# Patient Record
Sex: MALE | Race: BLACK OR AFRICAN AMERICAN | NOT HISPANIC OR LATINO | Employment: FULL TIME | URBAN - METROPOLITAN AREA
[De-identification: names, ages, dates, MRNs, and addresses within clinical notes are randomized per-mention and may not be internally consistent; named-entity substitution may affect disease eponyms.]

---

## 2017-04-26 ENCOUNTER — OFFICE VISIT (OUTPATIENT)
Dept: INTERNAL MEDICINE | Facility: CLINIC | Age: 52
End: 2017-04-26
Payer: COMMERCIAL

## 2017-04-26 ENCOUNTER — HOSPITAL ENCOUNTER (OUTPATIENT)
Dept: RADIOLOGY | Facility: HOSPITAL | Age: 52
Discharge: HOME | End: 2017-04-26
Attending: NURSE PRACTITIONER
Payer: COMMERCIAL

## 2017-04-26 VITALS
WEIGHT: 222.69 LBS | DIASTOLIC BLOOD PRESSURE: 92 MMHG | BODY MASS INDEX: 30.16 KG/M2 | TEMPERATURE: 97 F | SYSTOLIC BLOOD PRESSURE: 144 MMHG | HEIGHT: 72 IN | HEART RATE: 58 BPM

## 2017-04-26 DIAGNOSIS — R10.9 RIGHT FLANK PAIN: ICD-10-CM

## 2017-04-26 DIAGNOSIS — R10.9 RIGHT FLANK PAIN: Primary | ICD-10-CM

## 2017-04-26 PROCEDURE — 96372 THER/PROPH/DIAG INJ SC/IM: CPT | Mod: S$GLB,,, | Performed by: NURSE PRACTITIONER

## 2017-04-26 PROCEDURE — 99203 OFFICE O/P NEW LOW 30 MIN: CPT | Mod: 25,S$GLB,, | Performed by: NURSE PRACTITIONER

## 2017-04-26 PROCEDURE — 1160F RVW MEDS BY RX/DR IN RCRD: CPT | Mod: S$GLB,,, | Performed by: NURSE PRACTITIONER

## 2017-04-26 PROCEDURE — 74020 XR ABDOMEN FLAT AND ERECT: CPT | Mod: 26,,, | Performed by: RADIOLOGY

## 2017-04-26 PROCEDURE — 74020 XR ABDOMEN FLAT AND ERECT: CPT | Mod: TC,PO

## 2017-04-26 PROCEDURE — 99999 PR PBB SHADOW E&M-NEW PATIENT-LVL III: CPT | Mod: PBBFAC,,, | Performed by: NURSE PRACTITIONER

## 2017-04-26 RX ORDER — KETOROLAC TROMETHAMINE 30 MG/ML
60 INJECTION, SOLUTION INTRAMUSCULAR; INTRAVENOUS
Status: COMPLETED | OUTPATIENT
Start: 2017-04-26 | End: 2017-04-26

## 2017-04-26 RX ORDER — NAPROXEN 500 MG/1
500 TABLET ORAL 2 TIMES DAILY WITH MEALS
Qty: 14 TABLET | Refills: 0 | Status: SHIPPED | OUTPATIENT
Start: 2017-04-26 | End: 2017-05-03

## 2017-04-26 RX ADMIN — KETOROLAC TROMETHAMINE 60 MG: 30 INJECTION, SOLUTION INTRAMUSCULAR; INTRAVENOUS at 04:04

## 2017-04-26 NOTE — MR AVS SNAPSHOT
[facility]  [address] [place] [address]  Phone: [phone]  Fax: [phone]                  Kenneth Poon   2017 4:20 PM   Office Visit    Description:  Male : 1965   Provider:  Enid Quinonez NP   Department:  [facility]           Reason for Visit     Back Pain           Diagnoses this Visit        Comments    Right flank pain    -  Primary start naproxen tomorrow. dependent upon lab results and if pain persists.            To Do List           Goals (5 Years of Data)     None       These Medications        Disp Refills Start End    naproxen (NAPROSYN) 500 MG tablet 14 tablet 0 2017 5/3/2017    Take 1 tablet (500 mg total) by mouth 2 (two) times daily with meals. - Oral    Pharmacy: [facility]  [address]   [place]  #: [phone]         [facility]     [facility] Nurse Care Line -  Assistance  Unless otherwise directed by your provider, please contact [facility], our nurse care line that is available for  assistance.     Registered nurses in the [facility] provide: appointment scheduling, clinical advisement, health education, and other advisory services.  Call: [phone] (toll free)               Medications           Message regarding Medications     Verify the changes and/or additions to your medication regime listed below are the same as discussed with your clinician today.  If any of these changes or additions are incorrect, please notify your healthcare provider.        START taking these NEW medications        Refills    naproxen (NAPROSYN) 500 MG tablet 0    Sig: Take 1 tablet (500 mg total) by mouth 2 (two) times daily with meals.    Class: Normal    Route: Oral      These medications were administered today        Dose Freq    ketorolac injection 60 mg 60 mg Clinic/HOD 1 time    Sig: Inject 2 mLs (60 mg total) into the muscle one time.    Class:  Normal    Route: Intramuscular           Verify that the below list of medications is an accurate representation of the medications you are currently taking.  If none reported, the list may be blank. If incorrect, please contact your healthcare provider. Carry this list with you in case of emergency.           Current Medications     naproxen (NAPROSYN) 500 MG tablet Take 1 tablet (500 mg total) by mouth 2 (two) times daily with meals.           Clinical Reference Information           Your Vitals Were     BP Pulse Temp Height Weight BMI    144/92 58 96.7 °F (35.9 °C) (Tympanic) 6' (1.829 m) 101 kg (222 lb 10.6 oz) 30.2 kg/m2      Blood Pressure          Most Recent Value    BP  (!)  144/92      Allergies as of 4/26/2017     No Known Allergies      Immunizations Administered on Date of Encounter - 4/26/2017     None      Orders Placed During Today's Visit     Future Labs/Procedures Expected by Expires    CBC auto differential  4/26/2017 7/25/2017    Comprehensive metabolic panel  4/26/2017 7/25/2017    Urinalysis  4/26/2017 7/25/2017    X-Ray Abdomen Flat And Erect  4/26/2017 4/26/2018      [facility] Sign-Up     Activating your [facility] account is as easy as 1-2-3!     1) Visit [email or web address], select Sign Up Now, enter this activation code and your date of birth, then select Next.  [email or web address]  Expires: 6/10/2017  4:40 PM      2) Create a username and password to use when you visit [facility] in the future and select a security question in case you lose your password and select Next.    3) Enter your e-mail address and click Sign Up!    Additional Information  If you have questions, please e-mail [email or web address] or call [phone] to talk to our [facility] staff. Remember, [facility] is NOT to be used for urgent needs. For medical emergencies, dial 911.         Instructions    INCREASE WATER INTAKE         Language Assistance Services     ATTENTION: Language assistance services are available, free of  charge. Please call [phone].      ATENCIÓN: Si habla español, tiene a starks disposición servicios gratuitos de asistencia lingüística. Llame al [phone].     CHÚ Ý: N?u b?n nói Ti?ng Vi?t, có các d?ch v? h? tr? ngôn ng? mi?n phí dành cho b?n. G?i s? [phone].         [facility] complies with applicable Federal civil rights laws and does not discriminate on the basis of race, color, national origin, age, disability, or sex.

## 2017-04-26 NOTE — PROGRESS NOTES
Subjective:       Patient ID: Kenneth Poon is a 52 y.o. male.    Chief Complaint: Back Pain (right flank )    HPI Comments: Lower Back Pain- (right) started last night. Thought it was gas drank coke- belched a lot. Radiates from lower back to right lower abd and right groin. He denies pulling, pushing, or lifting anything heavy. He rates pain 7/10. pain is constant. He has not taken anything for pain. No hx of back surgeries, chronic back pain, kidney stones, gallstones, appendicitis, constipation,. No blood in stool. Regular BM every day. No fever, sweats, chills, cp, sob, n/v/d, headaches, blurred vision, dizziness, or any other acute problems. Pt admits to not drinking much water.      Has not seen a health care provider in 5 years- from [place]- needs PCP    No chronic problems besides BPH- no medications prescribed.     Back Pain   Pertinent negatives include no abdominal pain, chest pain, dysuria, fever or headaches.     Review of Systems   Constitutional: Negative for chills, fatigue and fever.   HENT: Negative for congestion, ear pain, postnasal drip, rhinorrhea, sinus pressure, sneezing and sore throat.    Eyes: Negative for photophobia, pain and visual disturbance.   Respiratory: Negative for cough, chest tightness and shortness of breath.    Cardiovascular: Negative for chest pain, palpitations and leg swelling.   Gastrointestinal: Negative for abdominal pain, constipation, diarrhea, nausea and vomiting.   Genitourinary: Positive for flank pain. Negative for dysuria and frequency.   Musculoskeletal: Negative for arthralgias.   Neurological: Negative for dizziness, light-headedness and headaches.   Psychiatric/Behavioral: Negative for sleep disturbance.       Objective:      Physical Exam   Constitutional: He is oriented to person, place, and time. He appears well-developed and well-nourished.   HENT:   Head: Normocephalic and atraumatic.   Right Ear: Tympanic membrane normal.   Left Ear: Tympanic  membrane normal.   Eyes: Conjunctivae and EOM are normal. Pupils are equal, round, and reactive to light.   Neck: Normal range of motion. Neck supple.   Cardiovascular: Normal rate, regular rhythm, normal heart sounds and intact distal pulses.    Pulmonary/Chest: Effort normal and breath sounds normal.   Abdominal: Soft. Bowel sounds are normal. There is CVA tenderness (right ).   Musculoskeletal: Normal range of motion.   Neurological: He is alert and oriented to person, place, and time.   Skin: Skin is warm and dry.   Psychiatric: He has a normal mood and affect.       Assessment:       1. Right flank pain        Plan:   Right flank pain  Comments:  start naproxen tomorrow. dependent upon lab results and if pain persists.   Orders:  -     X-Ray Abdomen Flat And Erect; Future; Expected date: 4/26/17  -     Comprehensive metabolic panel; Future; Expected date: 4/26/17  -     CBC auto differential; Future; Expected date: 4/26/17  -     Urinalysis; Future; Expected date: 4/26/17  -     ketorolac injection 60 mg; Inject 2 mLs (60 mg total) into the muscle one time.  -     naproxen (NAPROSYN) 500 MG tablet; Take 1 tablet (500 mg total) by mouth 2 (two) times daily with meals.  Dispense: 14 tablet; Refill: 0        As above  Increase water intake  Monitor BP  Establish with PCP

## 2017-05-02 ENCOUNTER — LAB VISIT (OUTPATIENT)
Dept: LAB | Facility: HOSPITAL | Age: 52
End: 2017-05-02
Attending: FAMILY MEDICINE
Payer: COMMERCIAL

## 2017-05-02 ENCOUNTER — OFFICE VISIT (OUTPATIENT)
Dept: INTERNAL MEDICINE | Facility: CLINIC | Age: 52
End: 2017-05-02
Payer: COMMERCIAL

## 2017-05-02 VITALS
DIASTOLIC BLOOD PRESSURE: 80 MMHG | BODY MASS INDEX: 30.67 KG/M2 | HEART RATE: 48 BPM | TEMPERATURE: 97 F | HEIGHT: 72 IN | SYSTOLIC BLOOD PRESSURE: 112 MMHG | WEIGHT: 226.44 LBS

## 2017-05-02 DIAGNOSIS — Z00.00 ANNUAL PHYSICAL EXAM: ICD-10-CM

## 2017-05-02 DIAGNOSIS — Z00.00 ANNUAL PHYSICAL EXAM: Primary | ICD-10-CM

## 2017-05-02 DIAGNOSIS — Z12.11 ENCOUNTER FOR SCREENING COLONOSCOPY: ICD-10-CM

## 2017-05-02 LAB
CHOLEST/HDLC SERPL: 3.7 {RATIO}
COMPLEXED PSA SERPL-MCNC: 0.29 NG/ML
HDL/CHOLESTEROL RATIO: 27.2 %
HDLC SERPL-MCNC: 180 MG/DL
HDLC SERPL-MCNC: 49 MG/DL
LDLC SERPL CALC-MCNC: 120 MG/DL
NONHDLC SERPL-MCNC: 131 MG/DL
TRIGL SERPL-MCNC: 55 MG/DL

## 2017-05-02 PROCEDURE — 80061 LIPID PANEL: CPT

## 2017-05-02 PROCEDURE — 36415 COLL VENOUS BLD VENIPUNCTURE: CPT | Mod: PO

## 2017-05-02 PROCEDURE — 99999 PR PBB SHADOW E&M-EST. PATIENT-LVL III: CPT | Mod: PBBFAC,,, | Performed by: FAMILY MEDICINE

## 2017-05-02 PROCEDURE — 84153 ASSAY OF PSA TOTAL: CPT

## 2017-05-02 PROCEDURE — 99386 PREV VISIT NEW AGE 40-64: CPT | Mod: S$GLB,,, | Performed by: FAMILY MEDICINE

## 2017-05-02 NOTE — LETTER
May 2, 2017      Enid Quinonez, EVELIO  [address] [place] [address]           [facility]  [address] [place]  [place] [address]  Phone: [phone]  Fax: [phone]          Patient: Kenneth Poon   MR Number: [id number]   YOB: 1965   Date of Visit: 5/2/2017       Dear Enid Quinonez:    Thank you for referring Kenneth Poon to me for evaluation. Attached you will find relevant portions of my assessment and plan of care.    If you have questions, please do not hesitate to call me. I look forward to following Kenneth Poon along with you.    Sincerely,    Alfa Mcmullen MD    Enclosure  CC:  No Recipients    If you would like to receive this communication electronically, please contact [email or web address] or [phone] to request more information on ???? Link access.    For providers and/or their staff who would like to refer a patient to [facility], please contact us through our one-stop-shop provider referral line, [facility] , at [phone].    If you feel you have received this communication in error or would no longer like to receive these types of communications, please e-mail [email or web address]

## 2017-05-02 NOTE — PROGRESS NOTES
Subjective:       Patient ID: Kenneth Poon is a 52 y.o. male.    Chief Complaint: Establish Care    HPI Comments: Establish Care:        Pt is a 52 year old who is in generally good health. Reviewed with pt colonoscopy screen PSA. Pt HR is low today but that has been a consistent finding on exams.         Review of Systems   Constitutional: Negative.    HENT: Negative.    Respiratory: Negative.    Cardiovascular: Negative.    Gastrointestinal: Negative.    Skin: Negative.    Neurological: Negative.    Psychiatric/Behavioral: Negative.        Objective:      Physical Exam   Constitutional: He is oriented to person, place, and time. He appears well-developed and well-nourished.   Cardiovascular: Normal rate and regular rhythm.    Pulmonary/Chest: Effort normal and breath sounds normal.   Abdominal: Soft. Bowel sounds are normal.   Musculoskeletal: Normal range of motion.   Neurological: He is alert and oriented to person, place, and time.   Skin: Skin is warm and dry.       Assessment:       1. Annual physical exam    2. Encounter for screening colonoscopy        Plan:       Annual physical exam  Comments:  Will do PSA and Lipid  Orders:  -     PSA, Screening; Future; Expected date: 5/2/17  -     Lipid panel; Future; Expected date: 5/2/17    Encounter for screening colonoscopy  Comments:  Will schedule for colonoscopy  Orders:  -     Case request GI: COLONOSCOPY

## 2017-08-07 PROBLEM — Z00.00 ANNUAL PHYSICAL EXAM: Status: RESOLVED | Noted: 2017-05-02 | Resolved: 2017-08-07

## 2018-02-20 ENCOUNTER — DOCUMENTATION ONLY (OUTPATIENT)
Dept: ENDOSCOPY | Facility: HOSPITAL | Age: 53
End: 2018-02-20

## 2020-04-09 ENCOUNTER — TELEPHONE (OUTPATIENT)
Dept: INTERNAL MEDICINE | Facility: CLINIC | Age: 55
End: 2020-04-09

## 2020-06-16 NOTE — MR AVS SNAPSHOT
[facility]  [address] [place] [address]  Phone: [phone]  Fax: [phone]                  Kenneth Poon   2017 7:20 AM   Office Visit    Description:  Male : 1965   Provider:  Alfa Mcmullen MD   Department:  [facility]           Reason for Visit     Establish Care           Diagnoses this Visit        Comments    Annual physical exam    -  Primary Will do PSA and Lipid    Encounter for screening colonoscopy     Will schedule for colonoscopy           To Do List           Future Appointments        Provider Department Dept Phone    2017 8:00 AM LAB, SAME DAY [facility] [phone]      Goals (5 Years of Data)     None      Follow-Up and Disposition     Return in about 1 year (around 2018).      [facility]     [facility] Nurse Care Line -  Assistance  Unless otherwise directed by your provider, please contact [facility], our nurse care line that is available for  assistance.     Registered nurses in the [facility] provide: appointment scheduling, clinical advisement, health education, and other advisory services.  Call: [phone] (toll free)               Medications           Message regarding Medications     Verify the changes and/or additions to your medication regime listed below are the same as discussed with your clinician today.  If any of these changes or additions are incorrect, please notify your healthcare provider.             Verify that the below list of medications is an accurate representation of the medications you are currently taking.  If none reported, the list may be blank. If incorrect, please contact your healthcare provider. Carry this list with you in case of emergency.           Current Medications     naproxen (NAPROSYN) 500 MG tablet Take 1 tablet (500 mg total) by mouth 2 (two) times daily with meals.           Clinical Reference Information            Your Vitals Were     BP Pulse Temp    112/80 (BP Location: Right arm, Patient Position: Sitting, BP Method: Manual) 48 96.5 °F (35.8 °C) (Tympanic)    Height Weight BMI    6' (1.829 m) 102.7 kg (226 lb 6.6 oz) 30.71 kg/m2      Blood Pressure          Most Recent Value    BP  112/80      Allergies as of 5/2/2017     No Known Allergies      Immunizations Administered on Date of Encounter - 5/2/2017     None      Orders Placed During Today's Visit      Normal Orders This Visit    Case request GI: COLONOSCOPY     Future Labs/Procedures Expected by Expires    Lipid panel  5/2/2017 5/2/2018    PSA, Screening  5/2/2017 7/1/2018      [facility] Sign-Up     Activating your [facility] account is as easy as 1-2-3!     1) Visit [email or web address], select Sign Up Now, enter this activation code and your date of birth, then select Next.  [email or web address]  Expires: 6/10/2017  4:40 PM      2) Create a username and password to use when you visit [facility] in the future and select a security question in case you lose your password and select Next.    3) Enter your e-mail address and click Sign Up!    Additional Information  If you have questions, please e-mail [email or web address] or call [phone] to talk to our [facility] staff. Remember, [facility] is NOT to be used for urgent needs. For medical emergencies, dial 911.         Language Assistance Services     ATTENTION: Language assistance services are available, free of charge. Please call [phone].      ATENCIÓN: Si habla español, tiene a starks disposición servicios gratuitos de asistencia lingüística. Llame al [phone].     CHÚ Ý: N?u b?n nói Ti?ng Vi?t, có các d?ch v? h? tr? ngôn ng? mi?n phí dành cho b?n. G?i s? [phone].         [facility] complies with applicable Federal civil rights laws and does not discriminate on the basis of race, color, national origin, age, disability, or sex.         Thalidomide Counseling: I discussed with the patient the risks of thalidomide including but not limited to birth defects, anxiety, weakness, chest pain, dizziness, cough and severe allergy.

## 2021-04-29 ENCOUNTER — PATIENT MESSAGE (OUTPATIENT)
Dept: RESEARCH | Facility: HOSPITAL | Age: 56
End: 2021-04-29